# Patient Record
Sex: MALE | Race: WHITE | NOT HISPANIC OR LATINO
[De-identification: names, ages, dates, MRNs, and addresses within clinical notes are randomized per-mention and may not be internally consistent; named-entity substitution may affect disease eponyms.]

---

## 2022-04-19 ENCOUNTER — APPOINTMENT (OUTPATIENT)
Dept: PLASTIC SURGERY | Facility: CLINIC | Age: 72
End: 2022-04-19
Payer: SELF-PAY

## 2022-04-19 VITALS
SYSTOLIC BLOOD PRESSURE: 132 MMHG | HEIGHT: 67 IN | TEMPERATURE: 97.7 F | DIASTOLIC BLOOD PRESSURE: 77 MMHG | BODY MASS INDEX: 24.33 KG/M2 | OXYGEN SATURATION: 99 % | WEIGHT: 155 LBS | HEART RATE: 83 BPM | RESPIRATION RATE: 20 BRPM

## 2022-04-19 DIAGNOSIS — H02.34 BLEPHAROCHALASIS RIGHT UPPER EYELID: ICD-10-CM

## 2022-04-19 DIAGNOSIS — H02.35 BLEPHAROCHALASIS RIGHT UPPER EYELID: ICD-10-CM

## 2022-04-19 DIAGNOSIS — H02.31 BLEPHAROCHALASIS RIGHT UPPER EYELID: ICD-10-CM

## 2022-04-19 DIAGNOSIS — H02.32 BLEPHAROCHALASIS RIGHT UPPER EYELID: ICD-10-CM

## 2022-04-19 PROBLEM — Z00.00 ENCOUNTER FOR PREVENTIVE HEALTH EXAMINATION: Status: ACTIVE | Noted: 2022-04-19

## 2022-04-19 PROCEDURE — 99203 OFFICE O/P NEW LOW 30 MIN: CPT

## 2022-05-11 PROBLEM — H02.31 BLEPHAROCHALASIS OF UPPER AND LOWER EYELIDS OF BOTH EYES: Status: ACTIVE | Noted: 2022-05-11

## 2022-05-11 NOTE — HISTORY OF PRESENT ILLNESS
[FreeTextEntry1] : Mr. PIEDAD CALLOWAY is a71 year White man complaining of the aged appearance of his upper and lower eyelids.  He has skin laxity on the upper eyelids, along with puffiness that is prominent and heavy.  On the lower eyelids, there is some puffiness as well, along with a tear trough deformity below his bags.  The canthus is mildly lax and will need to be tightened.  There is some skin excess and we discussed a scar along the natural skin supra tarsal fold crease above the eye in the upper eyelid and just below the lash line on the lower eyelids. We also discussed redraping of the fat in the lower eyelids and canthal support with suture tightening.  The patient is a good candidate for the proposed surgery and will benefit from the procedure. All questions were answered and the risks, benefits, alternatives, limitations, and permanent scars were outlined with him.

## 2022-05-11 NOTE — ASSESSMENT
[FreeTextEntry1] : PIEDAD CALLOWAY is a suitable candidate for upper and lower blepharoplasty.  His surgery will be scheduled at a convenient time with a period of healing that may vary from patient to patient.  The usual time away from work is two to three weeks and time away from exercise is three weeks.  It will be a full three months before  PIEDAD is feeling back to normal.  Elevation, lubrication, massage and cold compresses will be beneficial during the healing process.  If there is a history of an ophthalmologic problem, such as dry eye or glaucoma or diabetic ophthalmopathy, then the patient will need ophthalmology clearance  prior to the surgery.  All questions were answered and the risks, benefits, alternatives, limitations, and the permanent scars were outlined  with Mr. CALLOWAY .